# Patient Record
Sex: FEMALE | Race: OTHER | HISPANIC OR LATINO
[De-identification: names, ages, dates, MRNs, and addresses within clinical notes are randomized per-mention and may not be internally consistent; named-entity substitution may affect disease eponyms.]

---

## 2017-01-23 ENCOUNTER — APPOINTMENT (OUTPATIENT)
Dept: SURGICAL ONCOLOGY | Facility: CLINIC | Age: 67
End: 2017-01-23

## 2017-01-31 PROBLEM — D24.2 INTRADUCTAL PAPILLOMA OF LEFT BREAST: Status: ACTIVE | Noted: 2017-01-23

## 2017-02-01 ENCOUNTER — APPOINTMENT (OUTPATIENT)
Dept: SURGICAL ONCOLOGY | Facility: CLINIC | Age: 67
End: 2017-02-01

## 2017-02-01 ENCOUNTER — OTHER (OUTPATIENT)
Age: 67
End: 2017-02-01

## 2017-02-01 VITALS
OXYGEN SATURATION: 96 % | HEART RATE: 72 BPM | SYSTOLIC BLOOD PRESSURE: 160 MMHG | HEIGHT: 63 IN | TEMPERATURE: 97.9 F | BODY MASS INDEX: 30.12 KG/M2 | RESPIRATION RATE: 17 BRPM | WEIGHT: 170 LBS | DIASTOLIC BLOOD PRESSURE: 84 MMHG

## 2017-02-01 DIAGNOSIS — Z78.9 OTHER SPECIFIED HEALTH STATUS: ICD-10-CM

## 2017-02-01 DIAGNOSIS — D24.2 BENIGN NEOPLASM OF LEFT BREAST: ICD-10-CM

## 2017-02-01 DIAGNOSIS — Z86.79 PERSONAL HISTORY OF OTHER DISEASES OF THE CIRCULATORY SYSTEM: ICD-10-CM

## 2017-02-01 DIAGNOSIS — Z83.3 FAMILY HISTORY OF DIABETES MELLITUS: ICD-10-CM

## 2017-02-01 RX ORDER — CALCIUM CARBONATE 600 MG
TABLET ORAL
Refills: 0 | Status: ACTIVE | COMMUNITY

## 2017-02-01 RX ORDER — DIFLUPREDNATE 0.5 MG/ML
0.05 EMULSION OPHTHALMIC
Refills: 0 | Status: ACTIVE | COMMUNITY

## 2017-02-02 ENCOUNTER — OUTPATIENT (OUTPATIENT)
Dept: OUTPATIENT SERVICES | Facility: HOSPITAL | Age: 67
LOS: 1 days | End: 2017-02-02

## 2017-02-02 ENCOUNTER — RESULT REVIEW (OUTPATIENT)
Age: 67
End: 2017-02-02

## 2017-02-02 DIAGNOSIS — Z00.8 ENCOUNTER FOR OTHER GENERAL EXAMINATION: ICD-10-CM

## 2017-02-16 ENCOUNTER — OUTPATIENT (OUTPATIENT)
Dept: OUTPATIENT SERVICES | Facility: HOSPITAL | Age: 67
LOS: 1 days | End: 2017-02-16

## 2017-02-16 ENCOUNTER — APPOINTMENT (OUTPATIENT)
Dept: MAMMOGRAPHY | Facility: IMAGING CENTER | Age: 67
End: 2017-02-16

## 2017-02-16 ENCOUNTER — OUTPATIENT (OUTPATIENT)
Dept: OUTPATIENT SERVICES | Facility: HOSPITAL | Age: 67
LOS: 1 days | End: 2017-02-16
Payer: MEDICARE

## 2017-02-16 VITALS
HEART RATE: 71 BPM | TEMPERATURE: 98 F | SYSTOLIC BLOOD PRESSURE: 163 MMHG | DIASTOLIC BLOOD PRESSURE: 87 MMHG | WEIGHT: 169.98 LBS | RESPIRATION RATE: 16 BRPM | OXYGEN SATURATION: 96 % | HEIGHT: 63 IN

## 2017-02-16 DIAGNOSIS — H25.093 OTHER AGE-RELATED INCIPIENT CATARACT, BILATERAL: Chronic | ICD-10-CM

## 2017-02-16 DIAGNOSIS — N63 UNSPECIFIED LUMP IN BREAST: ICD-10-CM

## 2017-02-16 DIAGNOSIS — Z98.51 TUBAL LIGATION STATUS: Chronic | ICD-10-CM

## 2017-02-16 DIAGNOSIS — D24.2 BENIGN NEOPLASM OF LEFT BREAST: ICD-10-CM

## 2017-02-16 LAB
HCT VFR BLD CALC: 40.8 % — SIGNIFICANT CHANGE UP (ref 34.5–45)
HGB BLD-MCNC: 13.5 G/DL — SIGNIFICANT CHANGE UP (ref 11.5–15.5)
MCHC RBC-ENTMCNC: 27.8 PG — SIGNIFICANT CHANGE UP (ref 27–34)
MCHC RBC-ENTMCNC: 33.1 GM/DL — SIGNIFICANT CHANGE UP (ref 32–36)
MCV RBC AUTO: 84.1 FL — SIGNIFICANT CHANGE UP (ref 80–100)
PLATELET # BLD AUTO: 209 K/UL — SIGNIFICANT CHANGE UP (ref 150–400)
RBC # BLD: 4.85 M/UL — SIGNIFICANT CHANGE UP (ref 3.8–5.2)
RBC # FLD: 14 % — SIGNIFICANT CHANGE UP (ref 10.3–14.5)
WBC # BLD: 7.98 K/UL — SIGNIFICANT CHANGE UP (ref 3.8–10.5)
WBC # FLD AUTO: 7.98 K/UL — SIGNIFICANT CHANGE UP (ref 3.8–10.5)

## 2017-02-16 PROCEDURE — 85027 COMPLETE CBC AUTOMATED: CPT

## 2017-02-16 PROCEDURE — 19281 PERQ DEVICE BREAST 1ST IMAG: CPT

## 2017-02-16 PROCEDURE — C1739: CPT

## 2017-02-16 RX ORDER — LIDOCAINE HCL 20 MG/ML
0.2 VIAL (ML) INJECTION ONCE
Qty: 0 | Refills: 0 | Status: DISCONTINUED | OUTPATIENT
Start: 2017-02-22 | End: 2017-03-09

## 2017-02-16 RX ORDER — SODIUM CHLORIDE 9 MG/ML
3 INJECTION INTRAMUSCULAR; INTRAVENOUS; SUBCUTANEOUS EVERY 8 HOURS
Qty: 0 | Refills: 0 | Status: DISCONTINUED | OUTPATIENT
Start: 2017-02-22 | End: 2017-03-09

## 2017-02-16 RX ORDER — ACETAMINOPHEN 500 MG
975 TABLET ORAL ONCE
Qty: 0 | Refills: 0 | Status: COMPLETED | OUTPATIENT
Start: 2017-02-22 | End: 2017-02-22

## 2017-02-16 NOTE — H&P PST ADULT - VISION (WITH CORRECTIVE LENSES IF THE PATIENT USUALLY WEARS THEM):
s/p bilateral cataract extractions in10/16/Normal vision: sees adequately in most situations; can see medication labels, newsprint

## 2017-02-16 NOTE — H&P PST ADULT - FAMILY HISTORY
Mother  Still living? Yes, Estimated age:   Family history of diabetes mellitus in mother, Age at diagnosis: Age Unknown     Father  Still living? No  Family history of myocardial infarction in first degree male relative, Age at diagnosis: Age Unknown

## 2017-02-16 NOTE — H&P PST ADULT - HISTORY OF PRESENT ILLNESS
Pt is a 66 y.o WF who presents s/p routine mammo in December 2016 which revealed an area of microcalcifications in her left breast. Biopsy in January 20017  which revealed "benign disease". She had [facility]  localization 2/17/17. She is scheduled for Left Breast lumpectomy with [facility]  local.

## 2017-02-16 NOTE — H&P PST ADULT - PSH
Age-related incipient cataract of both eyes  Bilateral cataract extraction  History of tubal ligation

## 2017-02-21 ENCOUNTER — RESULT REVIEW (OUTPATIENT)
Age: 67
End: 2017-02-21

## 2017-02-22 ENCOUNTER — APPOINTMENT (OUTPATIENT)
Dept: SURGICAL ONCOLOGY | Facility: HOSPITAL | Age: 67
End: 2017-02-22

## 2017-02-22 ENCOUNTER — TRANSCRIPTION ENCOUNTER (OUTPATIENT)
Age: 67
End: 2017-02-22

## 2017-02-22 ENCOUNTER — OUTPATIENT (OUTPATIENT)
Dept: OUTPATIENT SERVICES | Facility: HOSPITAL | Age: 67
LOS: 1 days | Discharge: HOME | End: 2017-02-22
Payer: MEDICARE

## 2017-02-22 VITALS
DIASTOLIC BLOOD PRESSURE: 70 MMHG | RESPIRATION RATE: 16 BRPM | OXYGEN SATURATION: 100 % | TEMPERATURE: 98 F | HEART RATE: 79 BPM | SYSTOLIC BLOOD PRESSURE: 140 MMHG

## 2017-02-22 VITALS
TEMPERATURE: 98 F | SYSTOLIC BLOOD PRESSURE: 153 MMHG | DIASTOLIC BLOOD PRESSURE: 86 MMHG | RESPIRATION RATE: 18 BRPM | OXYGEN SATURATION: 98 % | WEIGHT: 169.98 LBS | HEIGHT: 62.99 IN

## 2017-02-22 DIAGNOSIS — H25.093 OTHER AGE-RELATED INCIPIENT CATARACT, BILATERAL: Chronic | ICD-10-CM

## 2017-02-22 DIAGNOSIS — D24.2 BENIGN NEOPLASM OF LEFT BREAST: ICD-10-CM

## 2017-02-22 DIAGNOSIS — Z98.51 TUBAL LIGATION STATUS: Chronic | ICD-10-CM

## 2017-02-22 PROCEDURE — 76098 X-RAY EXAM SURGICAL SPECIMEN: CPT | Mod: 26

## 2017-02-22 PROCEDURE — 76098 X-RAY EXAM SURGICAL SPECIMEN: CPT

## 2017-02-22 PROCEDURE — 19301 PARTIAL MASTECTOMY: CPT | Mod: LT

## 2017-02-22 RX ORDER — CELECOXIB 200 MG/1
200 CAPSULE ORAL ONCE
Qty: 0 | Refills: 0 | Status: COMPLETED | OUTPATIENT
Start: 2017-02-22 | End: 2017-02-22

## 2017-02-22 RX ORDER — OXYCODONE HYDROCHLORIDE 5 MG/1
5 TABLET ORAL ONCE
Qty: 0 | Refills: 0 | Status: DISCONTINUED | OUTPATIENT
Start: 2017-02-22 | End: 2017-03-09

## 2017-02-22 RX ORDER — ONDANSETRON 8 MG/1
4 TABLET, FILM COATED ORAL ONCE
Qty: 0 | Refills: 0 | Status: DISCONTINUED | OUTPATIENT
Start: 2017-02-22 | End: 2017-03-09

## 2017-02-22 RX ORDER — CELECOXIB 200 MG/1
200 CAPSULE ORAL ONCE
Qty: 0 | Refills: 0 | Status: DISCONTINUED | OUTPATIENT
Start: 2017-02-22 | End: 2017-03-09

## 2017-02-22 RX ORDER — FAMOTIDINE 10 MG/ML
1 INJECTION INTRAVENOUS
Qty: 0 | Refills: 0 | COMMUNITY

## 2017-02-22 RX ORDER — SODIUM CHLORIDE 9 MG/ML
1000 INJECTION INTRAMUSCULAR; INTRAVENOUS; SUBCUTANEOUS
Qty: 0 | Refills: 0 | Status: DISCONTINUED | OUTPATIENT
Start: 2017-02-22 | End: 2017-03-09

## 2017-02-22 RX ADMIN — Medication 975 MILLIGRAM(S): at 11:19

## 2017-02-22 RX ADMIN — CELECOXIB 200 MILLIGRAM(S): 200 CAPSULE ORAL at 11:19

## 2017-02-22 NOTE — ASU PATIENT PROFILE, ADULT - VISION (WITH CORRECTIVE LENSES IF THE PATIENT USUALLY WEARS THEM):
Normal vision: sees adequately in most situations; can see medication labels, newsprint/s/p bilateral cataract extractions in10/16

## 2017-02-28 LAB — SURGICAL PATHOLOGY STUDY: SIGNIFICANT CHANGE UP

## 2017-03-03 DIAGNOSIS — D24.2 BENIGN NEOPLASM OF LEFT BREAST: ICD-10-CM

## 2017-03-03 DIAGNOSIS — E66.9 OBESITY, UNSPECIFIED: ICD-10-CM

## 2017-03-03 DIAGNOSIS — D05.12 INTRADUCTAL CARCINOMA IN SITU OF LEFT BREAST: ICD-10-CM

## 2017-03-06 ENCOUNTER — APPOINTMENT (OUTPATIENT)
Dept: SURGICAL ONCOLOGY | Facility: CLINIC | Age: 67
End: 2017-03-06

## 2017-03-06 VITALS
SYSTOLIC BLOOD PRESSURE: 149 MMHG | TEMPERATURE: 98.8 F | WEIGHT: 170 LBS | DIASTOLIC BLOOD PRESSURE: 80 MMHG | BODY MASS INDEX: 30.12 KG/M2 | OXYGEN SATURATION: 96 % | HEIGHT: 63 IN

## 2017-03-06 DIAGNOSIS — Z09 ENCOUNTER FOR FOLLOW-UP EXAMINATION AFTER COMPLETED TREATMENT FOR CONDITIONS OTHER THAN MALIGNANT NEOPLASM: ICD-10-CM

## 2017-03-06 DIAGNOSIS — D05.12 INTRADUCTAL CARCINOMA IN SITU OF LEFT BREAST: ICD-10-CM

## 2017-03-06 RX ORDER — CHLORHEXIDINE GLUCONATE, 0.12% ORAL RINSE 1.2 MG/ML
0.12 SOLUTION DENTAL
Qty: 473 | Refills: 0 | Status: DISCONTINUED | COMMUNITY
Start: 2017-01-20 | End: 2017-03-06

## 2017-07-24 ENCOUNTER — APPOINTMENT (OUTPATIENT)
Dept: SURGICAL ONCOLOGY | Facility: CLINIC | Age: 67
End: 2017-07-24

## 2017-07-24 VITALS
HEART RATE: 71 BPM | OXYGEN SATURATION: 96 % | TEMPERATURE: 98.6 F | HEIGHT: 63 IN | DIASTOLIC BLOOD PRESSURE: 80 MMHG | RESPIRATION RATE: 16 BRPM | WEIGHT: 176 LBS | SYSTOLIC BLOOD PRESSURE: 126 MMHG | BODY MASS INDEX: 31.18 KG/M2

## 2017-07-24 DIAGNOSIS — Z85.3 ENCOUNTER FOR FOLLOW-UP EXAMINATION AFTER COMPLETED TREATMENT FOR MALIGNANT NEOPLASM: ICD-10-CM

## 2017-07-24 DIAGNOSIS — Z08 ENCOUNTER FOR FOLLOW-UP EXAMINATION AFTER COMPLETED TREATMENT FOR MALIGNANT NEOPLASM: ICD-10-CM

## 2017-07-24 RX ORDER — PREDNISOLONE ACETATE 10 MG/ML
1 SUSPENSION/ DROPS OPHTHALMIC
Qty: 5 | Refills: 0 | Status: ACTIVE | COMMUNITY
Start: 2017-07-19

## 2017-07-24 RX ORDER — NEPAFENAC 3 MG/ML
0.3 SUSPENSION/ DROPS OPHTHALMIC
Qty: 2 | Refills: 0 | Status: ACTIVE | COMMUNITY
Start: 2017-05-22

## 2017-12-13 ENCOUNTER — RX RENEWAL (OUTPATIENT)
Age: 67
End: 2017-12-13

## 2018-12-10 ENCOUNTER — RX RENEWAL (OUTPATIENT)
Age: 68
End: 2018-12-10

## 2019-07-01 NOTE — H&P PST ADULT - GASTROINTESTINAL
Physical Therapy Discharge Summary    Referred by: Robb Pulido DO  Medical Diagnosis (from order):   Diagnosis Information      Diagnosis    724.3 (ICD-9-CM) - M54.32 (ICD-10-CM) - Sciatica of left side                Current Functional Limitations: lifting is the biggest challenge    OBJECTIVE     Outcome Measure: (Outcome Scoring)   Oswestry Disability Index: Initial Score:  74; Discharge Outcome Score: 46    ASSESSMENT   After 4 sessions of physical therapy, patient self selects to discharge reporting she can manage the symptoms here out. Patient did make a 28 point decrease on her Oswestry. To date the patient has made gains as expected as reported.   Discharge from skilled therapy with instructions/recommendations: continue pain management strategies and home exercise program     PLAN    Discharge from therapy    Goals  1. Patient will be independent with progressed and modified home exercise program   2. Decrease pain/symptoms to 0-2/10  3. Improve involved strength to 4+/5-5/5  4. Improve involved range of motion to at least 75% - all motions  through improvements listed above patient will:  5. Be able to complete upper body, lower body dressing with minimal pain  6. Be able to reach overhead, at and above shoulder height, out to side, behind back with minimal pain/difficulty to improve activities of independent daily living, activity tolerance, completion of household tasks, completion of self-care tasks/dressing  7. Be able to ambulate for 30 minutes with minimal pain/difficulty  8. Be able to ascend and descend 1 flight of stairs using reciprocal pattern with minimal pain/difficulty  9. Be able to complete independent transfers with minimal pain/difficulty  10. Be able to exit a building rapidly  11. Be able to sleep 6 hours without disruption from pain  12. Be able to sit for 30 minutes with minimal pain/difficulty to improve activity tolerance, age appropriate activities, computer/desk work  13. Be  able to stand for 30 minutes with minimal pain/difficulty to improve age appropriate activities, completion of household tasks, grocery shopping  14. Be able to lift   with minimal pain/difficulty to improve house cleaning and care, yard tasks  15. Be able to bend/squat with minimal pain/difficulty to improve activity tolerance, completion of household tasks  16. Oswestry: Patient will complete form to reflect an improved score from initial score of 74 to less than or equal to 40% to indicate patient reported improvement in function/disability/impairment (minimal detectable change: 12%).    Discharge Measures:   Total Number of Visits: 4  Treatment Category: Lumbar, Non-Surgical With Radiculopathy  Outcome Measure: above  Primary Clinician: Michael Del Angel    Physical Therapy Daily Treatment    Visit Count: 4  Plan of Care: 6/20/2019 Through: 9/12/2019  Insurance Information:    Payor: [employer] Authorization Needed: yes with [employer]   Maximum Visit Limit Per Year: based on Medical necessity  Referred by: Robb Pulido DO; Next provider visit (if known/scheduled): primary MD 6/21/19  Medical Diagnosis (from order):       Diagnosis Information             Diagnosis      724.3 (ICD-9-CM) - M54.32 (ICD-10-CM) - Sciatica of left side                     Date of onset/injury: 6/19/19  Diagnosis Precautions: [+] radicular symptoms  Chart reviewed at time of initial evaluation (relevant co-morbidities, allergies, tests and medications listed): anxiety, depression, 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 Meds: diazepam, hydrocodone    SUBJECTIVE   \"I have a new pain with shooting pain in the front of the left thigh. These creates a sensation of instability. The pain was present last night and early this morning.\"  Current Pain (0-10 scale): 0   Functional Change: \"I watches television, read, and stayed at home this past weekend.\"    OBJECTIVE     [+] left lower extremity radicular symptoms in to dorsum of the  foot    Treatment     Therapeutic Exercise  Recumbent cycling, 6 min, level 3.0, [-] left lower extremity aggravation  Home exercise program was reviewed   Cueing to hold the piriformis stretch longer   The prone press-up can be progressed to prone on hands    Therapeutic Activity  Discharge assessment    Skilled input: verbal instruction/cues    Home Program:   Access Code: [email or web address]   URL: [email or web address]   Date: 07/01/2019   Prepared by: Michael Del Angel     Exercises   Prone Press Up on Elbows - 6 reps - 1 sets - 3 hold - 1x daily - 7x weekly   Standing Lumbar Extension - 6 reps - 1 sets - 3 hold - 1x daily - 7x weekly   Seated Piriformis Stretch - 3 reps - 1 sets - 20 hold - 1x daily - 7x weekly   Supine Dead Bug with Leg Extension - 10 reps - 1 sets - 3 hold - 1x daily - 7x weekly     Writer verbally educated the patient and received verbal consent from the patient on hand placement, positioning of patient, and techniques to be performed today including clothing adjustments for techniques as described above and how they are pertinent to the patient's plan of care.      Suggestions for next session as indicated: progress per plan of care,   lumbar extension postures, positions     ASSESSMENT   See above  Pain after treatment (patient reported, 0-10 scale): 0  Result of above outlined education: Verbalizes understanding    THERAPY DAILY BILLING   Insurance: WE[employer] 2. N/A    Evaluation Procedures:  No evaluation codes were used on this date of service    Timed Procedures:  Therapeutic Activity, 10 minutes  Therapeutic Exercise, 30 minutes    Untimed Procedures:  No untimed codes were used on this date of service    Total Treatment Time: 40 minutes   negative Soft, non-tender, no hepatosplenomegaly, normal bowel sounds

## 2019-12-13 ENCOUNTER — RX RENEWAL (OUTPATIENT)
Age: 69
End: 2019-12-13

## 2021-01-20 ENCOUNTER — RX RENEWAL (OUTPATIENT)
Age: 71
End: 2021-01-20

## 2021-09-25 ENCOUNTER — TRANSCRIPTION ENCOUNTER (OUTPATIENT)
Age: 71
End: 2021-09-25

## 2022-01-19 ENCOUNTER — RX RENEWAL (OUTPATIENT)
Age: 72
End: 2022-01-19

## 2022-01-19 RX ORDER — RALOXIFENE HYDROCHLORIDE 60 MG/1
60 TABLET, FILM COATED ORAL DAILY
Qty: 90 | Refills: 3 | Status: ACTIVE | COMMUNITY
Start: 2017-03-06 | End: 1900-01-01

## 2022-11-23 ENCOUNTER — APPOINTMENT (OUTPATIENT)
Dept: UROLOGY | Facility: CLINIC | Age: 72
End: 2022-11-23

## 2022-11-23 DIAGNOSIS — R31.29 OTHER MICROSCOPIC HEMATURIA: ICD-10-CM

## 2022-11-23 PROCEDURE — 99204 OFFICE O/P NEW MOD 45 MIN: CPT

## 2022-11-23 NOTE — ASSESSMENT
[FreeTextEntry1] : patient here with  \par  ( vag) not currenlty sexually active\par found to have blood on urine with recent exam - micro \par no LUTS or INC or pad  use\par denies tobacco use or exposure\par no renal stone hx \par shannan bowel habits \par admits to dec water intake\par here for further eval :\par 1 - get urine results from PCP - no records to review\par 2- repeat urine today \par 3- discussed that if blood found - proceed with ct and cysto jaz due to age : high risk

## 2022-11-25 LAB
APPEARANCE: CLEAR
BACTERIA: NEGATIVE
BILIRUBIN URINE: NEGATIVE
BLOOD URINE: ABNORMAL
COLOR: YELLOW
GLUCOSE QUALITATIVE U: NEGATIVE
HYALINE CASTS: 0 /LPF
KETONES URINE: NEGATIVE
LEUKOCYTE ESTERASE URINE: NEGATIVE
MICROSCOPIC-UA: NORMAL
NITRITE URINE: NEGATIVE
PH URINE: 6.5
PROTEIN URINE: NORMAL
RED BLOOD CELLS URINE: 3 /HPF
SPECIFIC GRAVITY URINE: 1.02
SQUAMOUS EPITHELIAL CELLS: 2 /HPF
UROBILINOGEN URINE: NORMAL
WHITE BLOOD CELLS URINE: 1 /HPF

## 2022-11-28 LAB — BACTERIA UR CULT: ABNORMAL

## 2022-11-30 ENCOUNTER — NON-APPOINTMENT (OUTPATIENT)
Age: 72
End: 2022-11-30

## 2022-12-08 ENCOUNTER — OUTPATIENT (OUTPATIENT)
Dept: OUTPATIENT SERVICES | Facility: HOSPITAL | Age: 72
LOS: 1 days | End: 2022-12-08
Payer: MEDICARE

## 2022-12-08 ENCOUNTER — APPOINTMENT (OUTPATIENT)
Dept: CT IMAGING | Facility: CLINIC | Age: 72
End: 2022-12-08

## 2022-12-08 DIAGNOSIS — H25.093 OTHER AGE-RELATED INCIPIENT CATARACT, BILATERAL: Chronic | ICD-10-CM

## 2022-12-08 DIAGNOSIS — Z98.51 TUBAL LIGATION STATUS: Chronic | ICD-10-CM

## 2022-12-08 DIAGNOSIS — Z00.00 ENCOUNTER FOR GENERAL ADULT MEDICAL EXAMINATION WITHOUT ABNORMAL FINDINGS: ICD-10-CM

## 2022-12-08 PROCEDURE — 74178 CT ABD&PLV WO CNTR FLWD CNTR: CPT

## 2022-12-08 PROCEDURE — 74178 CT ABD&PLV WO CNTR FLWD CNTR: CPT | Mod: 26,MH

## 2023-08-22 ENCOUNTER — APPOINTMENT (OUTPATIENT)
Dept: GASTROENTEROLOGY | Facility: CLINIC | Age: 73
End: 2023-08-22
Payer: MEDICARE

## 2023-08-22 VITALS
WEIGHT: 150 LBS | HEIGHT: 64 IN | BODY MASS INDEX: 25.61 KG/M2 | SYSTOLIC BLOOD PRESSURE: 128 MMHG | DIASTOLIC BLOOD PRESSURE: 70 MMHG | HEART RATE: 78 BPM | TEMPERATURE: 97.5 F | OXYGEN SATURATION: 96 %

## 2023-08-22 DIAGNOSIS — Z86.010 PERSONAL HISTORY OF COLONIC POLYPS: ICD-10-CM

## 2023-08-22 DIAGNOSIS — E11.9 TYPE 2 DIABETES MELLITUS W/OUT COMPLICATIONS: ICD-10-CM

## 2023-08-22 PROCEDURE — 99204 OFFICE O/P NEW MOD 45 MIN: CPT

## 2023-08-22 RX ORDER — METFORMIN HYDROCHLORIDE 500 MG/5ML
500 SOLUTION ORAL
Refills: 0 | Status: ACTIVE | COMMUNITY

## 2023-08-22 RX ORDER — SODIUM SULFATE, POTASSIUM SULFATE AND MAGNESIUM SULFATE 1.6; 3.13; 17.5 G/177ML; G/177ML; G/177ML
17.5-3.13-1.6 SOLUTION ORAL
Qty: 2 | Refills: 0 | Status: ACTIVE | COMMUNITY
Start: 2023-08-22 | End: 1900-01-01

## 2023-08-22 NOTE — HISTORY OF PRESENT ILLNESS
[FreeTextEntry1] : 72-year-old female with history of colon polyps due for follow-up surveillance colonoscopy.  Last colonoscopy 5 years ago.  Polyp removed at that time.  No GI complaints.  Takes metformin for diabetes.  History of breast cancer.

## 2023-08-22 NOTE — ASSESSMENT
[FreeTextEntry1] : Impression: History of colon polyps due for follow-up surveillance colonoscopy.  Diabetes on metformin.  Plan: We will schedule colonoscopy with generic Suprep.  Hold metformin morning of procedure.

## 2023-11-17 ENCOUNTER — APPOINTMENT (OUTPATIENT)
Dept: GASTROENTEROLOGY | Facility: AMBULATORY MEDICAL SERVICES | Age: 73
End: 2023-11-17
Payer: MEDICARE

## 2023-11-17 PROCEDURE — 45378 DIAGNOSTIC COLONOSCOPY: CPT | Mod: PT

## 2023-11-21 NOTE — ASU PREOP CHECKLIST - TAMPON REMOVED
Detail Level: Generalized Comment: Hx of BCC on R thigh and L upper arm. \\nFBSE- ShBx to lesion on the right proximal radial dorsal forearm to RO BCC vs [facility], noticed by pt. AK on the right lower leg treated with LN2.\\nFU 6 months, per pt request n/a

## 2025-05-21 NOTE — ASU DISCHARGE PLAN (ADULT/PEDIATRIC). - NOTIFY
14-May-2025
Bleeding that does not stop/Swelling that continues/Pain not relieved by Medications/Fever greater than 101